# Patient Record
Sex: FEMALE | URBAN - NONMETROPOLITAN AREA
[De-identification: names, ages, dates, MRNs, and addresses within clinical notes are randomized per-mention and may not be internally consistent; named-entity substitution may affect disease eponyms.]

---

## 2021-02-25 ENCOUNTER — APPOINTMENT (RX ONLY)
Dept: URBAN - NONMETROPOLITAN AREA CLINIC 25 | Facility: CLINIC | Age: 41
Setting detail: DERMATOLOGY
End: 2021-02-25

## 2021-02-25 DIAGNOSIS — L21.8 OTHER SEBORRHEIC DERMATITIS: ICD-10-CM

## 2021-02-25 DIAGNOSIS — L57.8 OTHER SKIN CHANGES DUE TO CHRONIC EXPOSURE TO NONIONIZING RADIATION: ICD-10-CM

## 2021-02-25 DIAGNOSIS — D18.0 HEMANGIOMA: ICD-10-CM

## 2021-02-25 DIAGNOSIS — D22 MELANOCYTIC NEVI: ICD-10-CM

## 2021-02-25 DIAGNOSIS — L259 CONTACT DERMATITIS AND OTHER ECZEMA, UNSPECIFIED CAUSE: ICD-10-CM

## 2021-02-25 DIAGNOSIS — Z41.9 ENCOUNTER FOR PROCEDURE FOR PURPOSES OTHER THAN REMEDYING HEALTH STATE, UNSPECIFIED: ICD-10-CM

## 2021-02-25 DIAGNOSIS — L82.0 INFLAMED SEBORRHEIC KERATOSIS: ICD-10-CM

## 2021-02-25 DIAGNOSIS — D49.2 NEOPLASM OF UNSPECIFIED BEHAVIOR OF BONE, SOFT TISSUE, AND SKIN: ICD-10-CM

## 2021-02-25 DIAGNOSIS — L44.8 OTHER SPECIFIED PAPULOSQUAMOUS DISORDERS: ICD-10-CM

## 2021-02-25 DIAGNOSIS — L90.5 SCAR CONDITIONS AND FIBROSIS OF SKIN: ICD-10-CM

## 2021-02-25 PROBLEM — D23.71 OTHER BENIGN NEOPLASM OF SKIN OF RIGHT LOWER LIMB, INCLUDING HIP: Status: ACTIVE | Noted: 2021-02-25

## 2021-02-25 PROBLEM — D23.61 OTHER BENIGN NEOPLASM OF SKIN OF RIGHT UPPER LIMB, INCLUDING SHOULDER: Status: ACTIVE | Noted: 2021-02-25

## 2021-02-25 PROBLEM — D23.62 OTHER BENIGN NEOPLASM OF SKIN OF LEFT UPPER LIMB, INCLUDING SHOULDER: Status: ACTIVE | Noted: 2021-02-25

## 2021-02-25 PROBLEM — D22.9 MELANOCYTIC NEVI, UNSPECIFIED: Status: ACTIVE | Noted: 2021-02-25

## 2021-02-25 PROBLEM — L30.8 OTHER SPECIFIED DERMATITIS: Status: ACTIVE | Noted: 2021-02-25

## 2021-02-25 PROBLEM — D23.72 OTHER BENIGN NEOPLASM OF SKIN OF LEFT LOWER LIMB, INCLUDING HIP: Status: ACTIVE | Noted: 2021-02-25

## 2021-02-25 PROBLEM — D18.01 HEMANGIOMA OF SKIN AND SUBCUTANEOUS TISSUE: Status: ACTIVE | Noted: 2021-02-25

## 2021-02-25 PROCEDURE — ? COUNSELING

## 2021-02-25 PROCEDURE — ? PRESCRIPTION

## 2021-02-25 PROCEDURE — ? MEDICAL CONSULTATION: LASER RESURFACING

## 2021-02-25 PROCEDURE — 99204 OFFICE O/P NEW MOD 45 MIN: CPT | Mod: 25

## 2021-02-25 PROCEDURE — ? IN-HOUSE DISPENSING PHARMACY

## 2021-02-25 PROCEDURE — ? SUNSCREEN RECOMMENDATIONS

## 2021-02-25 PROCEDURE — ? SHAVE REMOVAL

## 2021-02-25 PROCEDURE — ? OBSERVATION

## 2021-02-25 PROCEDURE — 11301 SHAVE SKIN LESION 0.6-1.0 CM: CPT

## 2021-02-25 PROCEDURE — ? PATIENT SPECIFIC COUNSELING

## 2021-02-25 PROCEDURE — 17110 DESTRUCTION B9 LES UP TO 14: CPT | Mod: 59

## 2021-02-25 PROCEDURE — ? LIQUID NITROGEN

## 2021-02-25 RX ORDER — KETOCONAZOLE 20 MG/ML
SHAMPOO, SUSPENSION TOPICAL AS DIRECTED
Qty: 1 | Refills: 6 | Status: ERX | COMMUNITY
Start: 2021-02-25

## 2021-02-25 RX ORDER — CLOBETASOL PROPIONATE 0.5 MG/G
OINTMENT TOPICAL
Qty: 1 | Refills: 1 | Status: ERX | COMMUNITY
Start: 2021-02-25

## 2021-02-25 RX ADMIN — KETOCONAZOLE: 20 SHAMPOO, SUSPENSION TOPICAL at 00:00

## 2021-02-25 RX ADMIN — CLOBETASOL PROPIONATE: 0.5 OINTMENT TOPICAL at 00:00

## 2021-02-25 ASSESSMENT — LOCATION DETAILED DESCRIPTION DERM
LOCATION DETAILED: LEFT SUPERIOR CENTRAL BUCCAL CHEEK
LOCATION DETAILED: LEFT SUPERIOR UPPER BACK
LOCATION DETAILED: LEFT ANTERIOR DISTAL THIGH
LOCATION DETAILED: RIGHT RADIAL DORSAL HAND
LOCATION DETAILED: LEFT ULNAR DORSAL HAND
LOCATION DETAILED: LEFT AXILLARY VAULT
LOCATION DETAILED: RIGHT ANTERIOR DISTAL THIGH
LOCATION DETAILED: LEFT LATERAL BREAST 4-5:00 REGION
LOCATION DETAILED: LEFT THENAR EMINENCE
LOCATION DETAILED: LEFT SUPERIOR MEDIAL UPPER BACK
LOCATION DETAILED: RIGHT RADIAL PALM

## 2021-02-25 ASSESSMENT — LOCATION ZONE DERM
LOCATION ZONE: AXILLAE
LOCATION ZONE: HAND
LOCATION ZONE: FACE
LOCATION ZONE: TRUNK
LOCATION ZONE: LEG

## 2021-02-25 ASSESSMENT — LOCATION SIMPLE DESCRIPTION DERM
LOCATION SIMPLE: RIGHT THIGH
LOCATION SIMPLE: LEFT THIGH
LOCATION SIMPLE: LEFT AXILLARY VAULT
LOCATION SIMPLE: LEFT BREAST
LOCATION SIMPLE: RIGHT HAND
LOCATION SIMPLE: LEFT CHEEK
LOCATION SIMPLE: LEFT HAND
LOCATION SIMPLE: LEFT UPPER BACK

## 2021-02-25 NOTE — PROCEDURE: MIPS QUALITY
Quality 265: Biopsy Follow-Up: Biopsy results reviewed, communicated, tracked, and documented
Quality 431: Preventive Care And Screening: Unhealthy Alcohol Use - Screening: Patient screened for unhealthy alcohol use using a single question and scores less than 2 times per year
Detail Level: Detailed
Quality 110: Preventive Care And Screening: Influenza Immunization: Influenza Immunization Administered during Influenza season
Quality 130: Documentation Of Current Medications In The Medical Record: Current Medications Documented

## 2021-02-25 NOTE — PROCEDURE: IN-HOUSE DISPENSING PHARMACY
Product 78 Amount/Unit (Numbers Only): 0
Product 35 Unit Type: mg
Product 5 Price/Unit (In Dollars): 0.00
Product 22 Application Directions: Apply twice a day for 2 weeks then stop for 1 week.
Product 1 Refills: 1
Product 23 Refills: 2
Name Of Product 8: Tretinoin 0.025% / Clindamycin Combination Cream
Product 24 Unit Type: grams
Product 31 Price/Unit (In Dollars): 40.00
Name Of Product 5: BP 8% Acne Wash
Product 24 Application Directions: Spray on shoulders after shower
Product 42 Price/Unit (In Dollars): 45.00
Render Product Pricing In Note: Yes
Product 9 Refills: 3
Name Of Product 26: Ketoconazole and hydrocortisone cream
Name Of Product 31: Urea 40% cream
Name Of Product 43: Alopecia Topical Solution for Men
Product 3 Application Directions: Apply pea sized amount nightly
Product 44 Amount/Unit (Numbers Only): 240
Product 29 Price/Unit (In Dollars): 65.00
Product 22 Price/Unit (In Dollars): 50.00
Product 71 Application Directions: Apply a pea sized amount BID
Product 44 Application Directions: Apply lightly once nightly
Product 44 Price/Unit (In Dollars): 90.00
Name Of Product 13: Rosacea Cream
Product 4 Application Directions: Apply once daily in the AM
Name Of Product 30: Clobetasol solution
Name Of Product 33: Seborrheic Dermatitis Shampoo 120ml
Product 11 Amount/Unit (Numbers Only): 30
Name Of Product 44: Anti-aging Body Tretinoin
Product 31 Application Directions: Apply to affected area twice daily for two weeks.
Product 5 Amount/Unit (Numbers Only): 120
Name Of Product 6: Clindamycin Gel
Name Of Product 9: Hydrating 0.05% Tretinoin Cream
Name Of Product 28: Triamcinolone Cream (30 gram)
Product 30 Application Directions: Apply BID to scalp x 2 weeks
Product 12 Application Directions: Apply pea sized amount to face BID
Product 21 Application Directions: Lather to ear 3 times weekly as needed
Name Of Product 32: Tacrolimus 0.1% Ointment
Product 43 Application Directions: Spray on scalp once daily
Name Of Product 3: Adapalene 0.3% Gel
Product 1 Application Directions: Wash face every morning
Name Of Product 7: Dapsone 6% Gel
Name Of Product 46: Anti fungal nail solution
Name Of Product 10: Female Hormonal Acne Gel
Name Of Product 25: Fluocinonide Cream
Name Of Product 51: Hydrating tretinoin 0.05%
Product 5 Application Directions: Wash face daily
Name Of Product 1: Sodium sulfacetamide 8%
Name Of Product 41: AK Imiquimod Gel
Product 46 Application Directions: Apply to affected nails daily x 12 weeks
Name Of Product 71: Azeliac acid
Product 32 Unit Type: tube(s)
Product 10 Application Directions: Apply to face in AM.
Name Of Product 61: Hydrating tretinoin 0.025%
Product 32 Refills: 5
Name Of Product 29: Triamcinolone Cream (120 grams)
Detail Level: Zone
Product 48 Application Directions: Wash scalp daily x 2 weeks then 3 times times weekly x 2 weeks  let sit for 5 minutes then rinse
Product 22 Amount/Unit (Numbers Only): 60
Name Of Product 2: Acne Triple Gel Combination
Product 2 Application Directions: Apply a pea-sized amount to entire face once nightly.
Name Of Product 42: Skin Brightening Hydroquinone 8% Combination
Product 11 Application Directions: Apply daily in AM
Product 28 Price/Unit (In Dollars): 30.00
Product 26 Application Directions: Apply bid for 2 weeks
Name Of Product 4: BP 2.5% / Clindamycin Combination Gel
Product 62 Application Directions: Apply daily to legs
Name Of Product 24: Fluocinolone Scalp and Body Oil
Product 10 Price/Unit (In Dollars): 50
Product 42 Application Directions: Apply topically to face once nightly, wash in AM. Wear sunscreen
Product 33 Application Directions: AppLy to scalp 3 times per week let sit 5 minutes then rinse
Name Of Product 27: Hydrocortisone 2.5% / Tranilast 0.5% / Levo 2%
Name Of Product 63: Hydrating tretinoin 0.1%
Name Of Product 62: Arnica bruise cream
Name Of Product 12: Rosacea Triple Combination Gel
Product 9 Application Directions: Apply pea sized amount to affected areas 2-3 nights per wk, work up to QHS.
Name Of Product 23: Clobetasol Ointment
Name Of Product 48: Seborrheic Dermatitis Shampoo
Product 29 Application Directions: Apply to affected areas bid for two weeks per month.
Product 23 Application Directions: Apply to affected areas daily up to two weeks per month
Name Of Product 45: Skin brightening hydroquinone 8% combination sensitive skin
Name Of Product 11: Metronidazole Gel
Name Of Product 21: Anti-Fungal Shampoo
Product 61 Application Directions: Spot treat x 4 weeks then apply to whole face nightly
Name Of Product 22: Clobetasol Cream
Product 27 Application Directions: Apply twice daily to affected areas for up to 2 weeks/month PRN.

## 2021-02-25 NOTE — PROCEDURE: LIQUID NITROGEN
Medical Necessity Information: It is in your best interest to select a reason for this procedure from the list below. All of these items fulfill various CMS LCD requirements except the new and changing color options.
Post-Care Instructions: I reviewed with the patient in detail post-care instructions. Patient is to wear sunprotection, and avoid picking at any of the treated lesions. Pt may apply Vaseline to crusted or scabbing areas.
Consent: The patient's consent was obtained including but not limited to risks of crusting, scabbing, blistering, scarring, darker or lighter pigmentary change, recurrence, incomplete removal and infection.
Render Post-Care Instructions In Note?: no
Number Of Freeze-Thaw Cycles: 2 freeze-thaw cycles
Detail Level: Detailed
Medical Necessity Clause: This procedure was medically necessary because the lesions that were treated were:

## 2021-02-25 NOTE — PROCEDURE: SHAVE REMOVAL
Render Path Notes In Note?: No
Anesthesia Type: 1% lidocaine with epinephrine
Post-Care Instructions: I reviewed with the patient in detail post-care instructions. Patient is to keep the biopsy site dry overnight, and then apply bacitracin twice daily until healed. Patient may apply hydrogen peroxide soaks to remove any crusting.
Wound Care: Aquaphor
Was A Bandage Applied: Yes
Medical Necessity Clause: This procedure was medically necessary because the lesion that was treated was:
Billing Type: Third-Party Bill
Anesthesia Volume In Cc: 0.5
Medical Necessity Information: It is in your best interest to select a reason for this procedure from the list below. All of these items fulfill various CMS LCD requirements except the new and changing color options.
Hemostasis: Electrocautery
X Size Of Lesion In Cm (Optional): 0
Consent was obtained from the patient. The risks and benefits to therapy were discussed in detail. Specifically, the risks of infection, scarring, bleeding, prolonged wound healing, incomplete removal, allergy to anesthesia, nerve injury and recurrence were addressed. Prior to the procedure, the treatment site was clearly identified and confirmed by the patient. All components of Universal Protocol/PAUSE Rule completed.
Biopsy Method: Personna blade
Notification Instructions: Patient will be notified of biopsy results. However, patient instructed to call the office if not contacted within 2 weeks.
Detail Level: Detailed
Size Of Lesion In Cm (Required): 0.6

## 2021-02-25 NOTE — HPI: EVALUATION OF SKIN LESION(S)
What Type Of Note Output Would You Prefer (Optional)?: Standard Output
Hpi Title: Evaluation of Skin Lesions
How Severe Are Your Spot(S)?: mild
Have Your Spot(S) Been Treated In The Past?: has not been treated
Additional History: Lots of spots, new red marks on each upper arm since pregnancy 2 yrs ago. Had what sounds like lump removed from upper arm yrs ago.